# Patient Record
Sex: FEMALE | NOT HISPANIC OR LATINO | URBAN - METROPOLITAN AREA
[De-identification: names, ages, dates, MRNs, and addresses within clinical notes are randomized per-mention and may not be internally consistent; named-entity substitution may affect disease eponyms.]

---

## 2021-05-28 ENCOUNTER — TRANSFERRED RECORDS (OUTPATIENT)
Dept: FAMILY MEDICINE | Facility: CLINIC | Age: 31
End: 2021-05-28

## 2021-05-28 LAB — HEP C HIM: NORMAL

## 2023-12-19 NOTE — PROGRESS NOTES
SUBJECTIVE:   CC: Arcenio Zepeda is an 33 year old woman who presents for preventive health visit.     Therapy 2 times per week  Partner passed away 8 months ago  [place] about us through peds clinic   Feels increased fatigue, intermittent lightheadedness going from sitting to standing, does not eat meals consistently throughout the day due to stress with her children - tries to stay well hydrated but might drink as much as she should     Right shoulder pain that can start at base of lateral neck and come up into area around ear, worse in the morning, does not like her bed, feels to firm, feels neck pain has worsened since her partner passed away 8 months ago, also has medial scapular pain with movement of shoulder - intermittent     Patient has been advised of split billing requirements and indicates understanding: Yes  Healthy Habits:  Do you get at least three servings of calcium containing foods daily (dairy, green leafy vegetables, etc.)? yes  Amount of exercise or daily activities, outside of work: 2 day(s) per week  Problems taking medications regularly No  Medication side effects: No  Have you had an eye exam in the past two years? no  Do you see a dentist twice per year? yes  Do you have sleep apnea, excessive snoring or daytime drowsiness?no      Today's PHQ-2 Score:       12/21/2023     9:56 AM 5/16/2014     3:26 PM   PHQ-2 ( 1999 [employer])   Q1: Little interest or pleasure in doing things 3 1   Q2: Feeling down, depressed or hopeless 3 3   PHQ-2 Score 6 4       Abuse: Current or Past(Physical, Sexual or Emotional)- No  Do you feel safe in your environment? Yes    Have you ever done Advance Care Planning? (For example, a Health Directive, POLST, or a discussion with a medical provider or your loved ones about your wishes): Yes, patient states has an Advance Care Planning document and will bring a copy to the clinic.    Social History     Tobacco Use    Smoking status: Every Day     Packs/day: .1     Types:  "Cigarettes    Smokeless tobacco: Not on file   Substance Use Topics    Alcohol use: Not on file                        Reviewed orders with patient.  Reviewed health maintenance and updated orders accordingly - Yes  Lab work is in process    FHS-7:        No data to display                Patient under 40 years of age: Routine Mammogram Screening not recommended.   Pertinent mammograms are reviewed under the imaging tab.    Pertinent mammograms are reviewed under the imaging tab.  History of abnormal Pap smear: NO - age 30-65 PAP every 5 years with negative HPV co-testing recommended     Reviewed and updated as needed this visit by clinical staff   Tobacco  Allergies  Meds  Problems  Med Hx  Surg Hx  Fam Hx          Reviewed and updated as needed this visit by Provider   Tobacco  Allergies  Meds  Problems  Med Hx  Surg Hx  Fam Hx         History reviewed. No pertinent past medical history.     ROS:  CONSTITUTIONAL: NEGATIVE for fever, chills, change in weight  INTEGUMENTARU/SKIN: NEGATIVE for worrisome rashes, moles or lesions  EYES: NEGATIVE for vision changes or irritation  ENT: NEGATIVE for ear, mouth and throat problems  RESP: NEGATIVE for significant cough or SOB  BREAST: NEGATIVE for masses, tenderness or discharge  CV: NEGATIVE for chest pain, palpitations or peripheral edema  GI: NEGATIVE for nausea, abdominal pain, heartburn, or change in bowel habits  : NEGATIVE for unusual urinary or vaginal symptoms. Periods are regular.  MUSCULOSKELETAL: NEGATIVE for significant arthralgias or myalgia  NEURO: NEGATIVE for weakness, dizziness or paresthesias  PSYCHIATRIC: NEGATIVE for changes in mood or affect    OBJECTIVE:   /63   Pulse 65   Ht 1.689 m (5' 6.5\")   Wt 95.7 kg (211 lb)   LMP 12/04/2023   SpO2 100%   BMI 33.55 kg/m    EXAM:  GENERAL: healthy, alert and no distress  EYES: Eyes grossly normal to inspection, PERRL and conjunctivae and sclerae normal  HENT: ear canals and TM's " normal, nose and mouth without ulcers or lesions  NECK: no adenopathy, no asymmetry, masses, or scars and thyroid normal to palpation  RESP: lungs clear to auscultation - no rales, rhonchi or wheezes  BREAST: deferrred  CV: regular rate and rhythm, normal S1 S2, no S3 or S4, no murmur, click or rub, no peripheral edema and peripheral pulses strong  ABDOMEN: soft, nontender, no hepatosplenomegaly, no masses and bowel sounds normal  MS: no gross musculoskeletal defects noted, no edema  SKIN: dry, cracking skin to bilateral hands, no suspicious lesions or rashes  NEURO: Normal strength and tone, mentation intact and speech normal  PSYCH: mentation appears normal, affect normal/bright    Diagnostic Test Results:  Labs reviewed in Epic  Results for orders placed or performed in visit on 12/21/23 (from the past 24 hour(s))   CBC with Diff/Plt ([facility])   Result Value Ref Range    WBC x10/cmm 5.4 3.8 - 11.0 x10/cmm    % Lymphocytes 30.7 20.5 - 51.1 %    % Monocytes 6.5 1.7 - 9.3 %    % Granulocytes 62.8 42.2 - 75.2 %    RBC x10/cmm 3.88 3.7 - 5.2 x10/cmm    Hemoglobin 11.4 (A) 11.8 - 15.5 g/dl    Hematocrit 33.9 (A) 35 - 46 %    MCV 87.5 80 - 100 fL    MCH 29.4 27.0 - 31.0 pg    MCHC 33.6 33.0 - 37.0 g/dL    Platelet Count 348 140 - 450 K/uL       ASSESSMENT/PLAN:   Arcenio was seen today for physical and diabetes.    Diagnoses and all orders for this visit:    Routine general medical examination at a health care facility    Hyperlipidemia with target LDL less than 130  -     Lipid Profile ([facility])  -     VENOUS COLLECTION  - Diet and exercise discussed     Other fatigue  -     Comprehensive metabolic panel; Future  -     VENOUS COLLECTION  -     CBC with Diff/Plt ([facility])  -     Vitamin D Deficiency; Future  -     Ferritin; Future  -     Iron & Iron Binding Capacity; Future  -     TSH with free T4 reflex; Future  -     Comprehensive metabolic panel  -     Vitamin D Deficiency  -     Ferritin  -     Iron & Iron Binding Capacity  -    "  TSH with free T4 reflex  - Will draw lab work and based plan of care on results    Family history of diabetes mellitus  -     VENOUS COLLECTION  -     Cancel: Hemoglobin A1C ([facility])  -     Hemoglobin A1c; Future  -     Hemoglobin A1c  - Diet and exercise discussed     Eczema, unspecified type  -     triamcinolone (KENALOG) 0.1 % external cream; Apply topically 2 times daily    Acute pain of right shoulder  -     Physical Therapy Referral; Future        Patient has been advised of split billing requirements and indicates understanding: Yes  COUNSELING:   Reviewed preventive health counseling, as reflected in patient instructions       Regular exercise       Healthy diet/nutrition    Estimated body mass index is 33.55 kg/m  as calculated from the following:    Height as of this encounter: 1.689 m (5' 6.5\").    Weight as of this encounter: 95.7 kg (211 lb).    Weight management plan: Discussed healthy diet and exercise guidelines    She reports that she has been smoking cigarettes. She has been smoking an average of 0.1 packs per day. She does not have any smokeless tobacco history on file.  Nicotine/Tobacco Cessation Plan:   Information offered: Patient not interested at this time      Counseling Resources:  ATP IV Guidelines  Pooled Cohorts Equation Calculator  Breast Cancer Risk Calculator  BRCA-Related Cancer Risk Assessment: FHS-7 Tool  FRAX Risk Assessment  ICSI Preventive Guidelines  Dietary Guidelines for Americans, 2010  USDA's MyPlate  ASA Prophylaxis  Lung CA Screening    NANDA Singer [facility]  "

## 2023-12-21 ENCOUNTER — OFFICE VISIT (OUTPATIENT)
Dept: FAMILY MEDICINE | Facility: CLINIC | Age: 33
End: 2023-12-21

## 2023-12-21 VITALS
WEIGHT: 211 LBS | SYSTOLIC BLOOD PRESSURE: 127 MMHG | OXYGEN SATURATION: 100 % | HEART RATE: 65 BPM | DIASTOLIC BLOOD PRESSURE: 63 MMHG | BODY MASS INDEX: 33.12 KG/M2 | HEIGHT: 67 IN

## 2023-12-21 DIAGNOSIS — E78.5 HYPERLIPIDEMIA WITH TARGET LDL LESS THAN 130: ICD-10-CM

## 2023-12-21 DIAGNOSIS — L30.9 ECZEMA, UNSPECIFIED TYPE: ICD-10-CM

## 2023-12-21 DIAGNOSIS — Z83.3 FAMILY HISTORY OF DIABETES MELLITUS: ICD-10-CM

## 2023-12-21 DIAGNOSIS — R53.83 OTHER FATIGUE: ICD-10-CM

## 2023-12-21 DIAGNOSIS — Z00.00 ROUTINE GENERAL MEDICAL EXAMINATION AT A HEALTH CARE FACILITY: Primary | ICD-10-CM

## 2023-12-21 DIAGNOSIS — M25.511 ACUTE PAIN OF RIGHT SHOULDER: ICD-10-CM

## 2023-12-21 LAB
% GRANULOCYTES: 62.8 % (ref 42.2–75.2)
ALBUMIN SERPL BCG-MCNC: 4.3 G/DL (ref 3.5–5.2)
ALP SERPL-CCNC: 49 U/L (ref 40–150)
ALT SERPL W P-5'-P-CCNC: 8 U/L (ref 0–50)
ANION GAP SERPL CALCULATED.3IONS-SCNC: 8 MMOL/L (ref 7–15)
AST SERPL W P-5'-P-CCNC: 17 U/L (ref 0–45)
BILIRUB SERPL-MCNC: 0.2 MG/DL
BUN SERPL-MCNC: 9.6 MG/DL (ref 6–20)
CALCIUM SERPL-MCNC: 8.9 MG/DL (ref 8.6–10)
CHLORIDE SERPL-SCNC: 103 MMOL/L (ref 98–107)
CHOLESTEROL: 164 MG/DL (ref 100–199)
CREAT SERPL-MCNC: 0.7 MG/DL (ref 0.51–0.95)
DEPRECATED HCO3 PLAS-SCNC: 26 MMOL/L (ref 22–29)
EGFRCR SERPLBLD CKD-EPI 2021: >90 ML/MIN/1.73M2
FASTING?: YES
FERRITIN SERPL-MCNC: 15 NG/ML (ref 6–175)
GLUCOSE SERPL-MCNC: 83 MG/DL (ref 70–99)
HBA1C MFR BLD: 5.3 %
HCT VFR BLD AUTO: 33.9 % (ref 35–46)
HDL (RMG): 70 MG/DL (ref 40–?)
HEMOGLOBIN: 11.4 G/DL (ref 11.8–15.5)
IRON BINDING CAPACITY (ROCHE): 388 UG/DL (ref 240–430)
IRON SATN MFR SERPL: 20 % (ref 15–46)
IRON SERPL-MCNC: 78 UG/DL (ref 37–145)
LDL CALCULATED (RMG): 75 MG/DL (ref 0–130)
LYMPHOCYTES NFR BLD AUTO: 30.7 % (ref 20.5–51.1)
MCH RBC QN AUTO: 29.4 PG (ref 27–31)
MCHC RBC AUTO-ENTMCNC: 33.6 G/DL (ref 33–37)
MCV RBC AUTO: 87.5 FL (ref 80–100)
MONOCYTES NFR BLD AUTO: 6.5 % (ref 1.7–9.3)
PLATELET # BLD AUTO: 348 K/UL (ref 140–450)
POTASSIUM SERPL-SCNC: 4.2 MMOL/L (ref 3.4–5.3)
PROT SERPL-MCNC: 7 G/DL (ref 6.4–8.3)
RBC # BLD AUTO: 3.88 X10/CMM (ref 3.7–5.2)
SODIUM SERPL-SCNC: 137 MMOL/L (ref 135–145)
TRIGLYCERIDES (RMG): 97 MG/DL (ref 0–149)
TSH SERPL DL<=0.005 MIU/L-ACNC: 0.92 UIU/ML (ref 0.3–4.2)
VIT D+METAB SERPL-MCNC: 24 NG/ML (ref 20–50)
WBC # BLD AUTO: 5.4 X10/CMM (ref 3.8–11)

## 2023-12-21 PROCEDURE — 84443 ASSAY THYROID STIM HORMONE: CPT

## 2023-12-21 PROCEDURE — 85025 COMPLETE CBC W/AUTO DIFF WBC: CPT

## 2023-12-21 PROCEDURE — 82728 ASSAY OF FERRITIN: CPT

## 2023-12-21 PROCEDURE — 82306 VITAMIN D 25 HYDROXY: CPT

## 2023-12-21 PROCEDURE — 80053 COMPREHEN METABOLIC PANEL: CPT

## 2023-12-21 PROCEDURE — 99385 PREV VISIT NEW AGE 18-39: CPT

## 2023-12-21 PROCEDURE — 83550 IRON BINDING TEST: CPT

## 2023-12-21 PROCEDURE — 83036 HEMOGLOBIN GLYCOSYLATED A1C: CPT

## 2023-12-21 PROCEDURE — 36415 COLL VENOUS BLD VENIPUNCTURE: CPT

## 2023-12-21 PROCEDURE — 80061 LIPID PANEL: CPT | Mod: QW

## 2023-12-21 PROCEDURE — 83540 ASSAY OF IRON: CPT

## 2023-12-21 RX ORDER — FERROUS GLUCONATE 324(38)MG
324 TABLET ORAL
COMMUNITY

## 2023-12-21 RX ORDER — TRIAMCINOLONE ACETONIDE 1 MG/G
CREAM TOPICAL 2 TIMES DAILY
Qty: 45 G | Refills: 0 | Status: SHIPPED | OUTPATIENT
Start: 2023-12-21 | End: 2024-07-01

## 2023-12-21 RX ORDER — MULTIPLE VITAMINS W/ MINERALS TAB 9MG-400MCG
1 TAB ORAL DAILY
COMMUNITY

## 2023-12-21 RX ORDER — MULTIVITAMIN WITH IRON
1 TABLET ORAL DAILY
COMMUNITY

## 2023-12-21 ASSESSMENT — ANXIETY QUESTIONNAIRES
3. WORRYING TOO MUCH ABOUT DIFFERENT THINGS: MORE THAN HALF THE DAYS
GAD7 TOTAL SCORE: 12
IF YOU CHECKED OFF ANY PROBLEMS ON THIS QUESTIONNAIRE, HOW DIFFICULT HAVE THESE PROBLEMS MADE IT FOR YOU TO DO YOUR WORK, TAKE CARE OF THINGS AT HOME, OR GET ALONG WITH OTHER PEOPLE: SOMEWHAT DIFFICULT
6. BECOMING EASILY ANNOYED OR IRRITABLE: NEARLY EVERY DAY
1. FEELING NERVOUS, ANXIOUS, OR ON EDGE: NEARLY EVERY DAY
GAD7 TOTAL SCORE: 12
7. FEELING AFRAID AS IF SOMETHING AWFUL MIGHT HAPPEN: NOT AT ALL
2. NOT BEING ABLE TO STOP OR CONTROL WORRYING: MORE THAN HALF THE DAYS
5. BEING SO RESTLESS THAT IT IS HARD TO SIT STILL: NOT AT ALL

## 2023-12-21 ASSESSMENT — PATIENT HEALTH QUESTIONNAIRE - PHQ9
5. POOR APPETITE OR OVEREATING: MORE THAN HALF THE DAYS
SUM OF ALL RESPONSES TO PHQ QUESTIONS 1-9: 17

## 2024-02-06 ENCOUNTER — OFFICE VISIT (OUTPATIENT)
Dept: FAMILY MEDICINE | Facility: CLINIC | Age: 34
End: 2024-02-06

## 2024-02-06 VITALS
BODY MASS INDEX: 34.18 KG/M2 | OXYGEN SATURATION: 100 % | SYSTOLIC BLOOD PRESSURE: 127 MMHG | WEIGHT: 215 LBS | TEMPERATURE: 99.8 F | DIASTOLIC BLOOD PRESSURE: 67 MMHG | HEART RATE: 90 BPM

## 2024-02-06 DIAGNOSIS — J02.9 ACUTE PHARYNGITIS, UNSPECIFIED ETIOLOGY: ICD-10-CM

## 2024-02-06 DIAGNOSIS — J02.0 ACUTE STREPTOCOCCAL PHARYNGITIS: Primary | ICD-10-CM

## 2024-02-06 LAB
INFLUENZA A (RMG): NEGATIVE
INFLUENZA B (RMG): NEGATIVE
S PYO AG THROAT QL IA.RAPID: POSITIVE
SARS ANTIGEN (RMG): NEGATIVE

## 2024-02-06 PROCEDURE — 99213 OFFICE O/P EST LOW 20 MIN: CPT

## 2024-02-06 PROCEDURE — 87430 STREP A AG IA: CPT

## 2024-02-06 PROCEDURE — 87428 SARSCOV & INF VIR A&B AG IA: CPT | Mod: QW

## 2024-02-06 RX ORDER — PENICILLIN V POTASSIUM 500 MG/1
500 TABLET, FILM COATED ORAL 2 TIMES DAILY
Qty: 20 TABLET | Refills: 0 | Status: SHIPPED | OUTPATIENT
Start: 2024-02-06 | End: 2024-02-16

## 2024-02-06 NOTE — PROGRESS NOTES
Assessment & Plan     Acute pharyngitis, unspecified etiology  - Influenza + SARS Antigen ([facility])  - Rapid Strep ([facility])    Acute streptococcal pharyngitis  Rapid strep test was positive. Rx for Penicillin. Side effects reviewed. Take antibiotic as prescribed for the full duration. Limit contacts -you are contagious until you have been on antibiotics for 24 hours. Change out tooth brush in 24 hours. May take OTC analgesics as needed for pain. Discussed plenty of fluids, rest, and throat lozenges. Follow up as needed for new or worsening symptoms. Patient agreeable to plan. All questions answered.    - penicillin V (VEETID) 500 MG tablet  Dispense: 20 tablet; Refill: 0            Work on weight loss  Regular exercise  See Patient Instructions    Return if symptoms worsen or fail to improve, for Follow up.    Jennifer Rg is a 34 year old, presenting for the following health issues:  URI (Body aches, chills, fatigue, headache, throat pain, bloody nose/Covid test this am: Negative/Burned throat badly on coffee yesterday and got adjustment at chiropractor/Symptoms started yesterday)    HPI       Concern - URI, sore throat  Onset: Last night while sleeping  Description: Stabbing, dull ache in throat  Intensity: 7/10  Progression of Symptoms:  same and constant  Accompanying Signs & Symptoms: Sore throat, body aches, chills, fatigue  Previous history of similar problem: felt like strep  Precipitating factors:        Worsened by: Swallowing  Alleviating factors:        Improved by: sleep   Therapies tried and outcome: cough drops  Kids in . Daughter was recently sick      Review of Systems  Constitutional, HEENT, cardiovascular, pulmonary, gi and gu systems are negative, except as otherwise noted.      Objective    /67   Pulse 90   Temp 99.8  F (37.7  C)   Wt 97.5 kg (215 lb)   LMP 12/04/2023   SpO2 100%   BMI 34.18 kg/m    Body mass index is 34.18 kg/m .  Physical Exam   GENERAL: alert and no  distress  HENT: normal cephalic/atraumatic, ear canals and TM's normal, nose and mouth without ulcers or lesions, oropharynx clear, oral mucous membranes moist, and pharynx erythema  NECK: cervical adenopathy +1 bilaterally   RESP: lungs clear to auscultation - no rales, rhonchi or wheezes  CV: regular rate and rhythm, normal S1 S2, no S3 or S4, no murmur, click or rub, no peripheral edema  PSYCH: mentation appears normal, affect normal/bright    Results for orders placed or performed in visit on 02/06/24 (from the past 24 hour(s))   Influenza + SARS Antigen ([facility])   Result Value Ref Range    INFLUENZA A ([facility]) Negative Negative    INFLUENZA B ([facility]) Negative Negative    SARS ANTIGEN ([facility]) Negative Negative   Rapid Strep ([facility])   Result Value Ref Range    Rapid Strep A Screen Positive (A)            Signed Electronically by: NANDA Campos CNP

## 2024-02-20 ENCOUNTER — OFFICE VISIT (OUTPATIENT)
Dept: FAMILY MEDICINE | Facility: CLINIC | Age: 34
End: 2024-02-20

## 2024-02-20 VITALS
WEIGHT: 215 LBS | HEART RATE: 78 BPM | DIASTOLIC BLOOD PRESSURE: 69 MMHG | SYSTOLIC BLOOD PRESSURE: 121 MMHG | OXYGEN SATURATION: 99 % | BODY MASS INDEX: 34.18 KG/M2

## 2024-02-20 DIAGNOSIS — M79.674 PAIN IN TOES OF BOTH FEET: Primary | ICD-10-CM

## 2024-02-20 DIAGNOSIS — M79.675 PAIN IN TOES OF BOTH FEET: Primary | ICD-10-CM

## 2024-02-20 PROCEDURE — 99213 OFFICE O/P EST LOW 20 MIN: CPT

## 2024-02-20 NOTE — PROGRESS NOTES
Assessment & Plan     Pain in toes of both feet  Unknown etiology. Reviewed potential contributing causes. Recommend OTC analgesics as needed for pain or discomfort. Reviewed resting and elevation as needed. Red flags that warrant emergent evaluation discussed. Follow up as needed for new or worsening symptoms or if symptoms fail to improve. Patient agreeable to plan. All questions answered.         Work on weight loss  Regular exercise  See Patient Instructions    Return if symptoms worsen or fail to improve, for Follow up.    Jennifer Rg is a 34 year old, presenting for the following health issues:  Throat Problem (Strep + 12 days ago/Lots of phlegm (better today)) and Toe Pain (Tips of toes - comes/goes. Can be painful couple days, red, inflamed. Dull throb, itchy, painful to touch/Trying different shoes (bigger), socks off)    HPI     Post strep: Feeling better but states she feels like there was a constant ball of phlegm in the back of her throat. Worse after taking the antibiotics. Symptoms went away yesterday.       Concern - Tips of toes  Onset: 5 months. No injury  Description: Comes randomly, no connections to anything. Half of her toes will hurt and painful to the point she doesn't want to walk. Feels that it has been getting worse then goes away after 4 days. Has light symptoms today. Better today.   Intensity: none today  Progression of Symptoms:  improving, intermittent  Accompanying Signs & Symptoms: Feels swollen but doesn't look swollen. Painful to the touch  Previous history of similar problem: just toes localized, no prior history of symptoms  Precipitating factors:        Worsened by: walking  Alleviating factors:        Improved by: none, just starts going away on its own  Therapies tried and outcome: different socks and shoes. Never painful enough to try OTC medications        Review of Systems  Constitutional, HEENT, cardiovascular, pulmonary, gi and gu systems are negative, except  as otherwise noted.      Objective    /69   Pulse 78   Wt 97.5 kg (215 lb)   SpO2 99%   BMI 34.18 kg/m    Body mass index is 34.18 kg/m .  Physical Exam   GENERAL: alert and no distress  RESP: lungs clear to auscultation - no rales, rhonchi or wheezes  CV: regular rate and rhythm, normal S1 S2, no S3 or S4, no murmur, click or rub, no peripheral edema  MS: no gross musculoskeletal defects noted, no edema  SKIN: no suspicious lesions or rashes  NEURO: Normal strength and tone, mentation intact and speech normal  PSYCH: mentation appears normal, affect normal/bright          Signed Electronically by: NANDA Campos CNP

## 2024-07-01 ENCOUNTER — VIRTUAL VISIT (OUTPATIENT)
Dept: FAMILY MEDICINE | Facility: CLINIC | Age: 34
End: 2024-07-01

## 2024-07-01 DIAGNOSIS — K21.9 GASTROESOPHAGEAL REFLUX DISEASE WITHOUT ESOPHAGITIS: Primary | ICD-10-CM

## 2024-07-01 DIAGNOSIS — L30.9 ECZEMA, UNSPECIFIED TYPE: ICD-10-CM

## 2024-07-01 PROCEDURE — 99442 PR PHYSICIAN TELEPHONE EVALUATION 11-20 MIN: CPT | Mod: 95

## 2024-07-01 RX ORDER — TRIAMCINOLONE ACETONIDE 1 MG/G
CREAM TOPICAL 2 TIMES DAILY
Qty: 45 G | Refills: 0 | Status: SHIPPED | OUTPATIENT
Start: 2024-07-01

## 2024-07-01 RX ORDER — OMEPRAZOLE 40 MG/1
40 CAPSULE, DELAYED RELEASE ORAL DAILY
Qty: 60 CAPSULE | Refills: 1 | Status: SHIPPED | OUTPATIENT
Start: 2024-07-01

## 2024-07-01 RX ORDER — OMEPRAZOLE 40 MG/1
40 CAPSULE, DELAYED RELEASE ORAL DAILY
Qty: 30 CAPSULE | Refills: 0 | Status: SHIPPED | OUTPATIENT
Start: 2024-07-01 | End: 2024-07-01 | Stop reason: DRUGHIGH

## 2024-07-01 NOTE — PATIENT INSTRUCTIONS
"Gastroesophageal reflux (GERD)/Heartburn:     *  Your symptoms are due to acid from the stomach going up into the esophagus and causing irritation.     *  Anti reflux measures:     --Avoid meals within 3 hours of bedtime.     --Consider using a \"wedge pillow\" to elevated your head while sleeping.      Wedge pillow:  Can get these at any medical supply store or [employer]        *  Many times, this condition is caused by what you eat, when you eat, how you eat, how much you eat.    If there are foods, food combinations or eating patterns that cause your symptoms to be worse, then alter these things to see if things improve.     * Specifically avoid any specific foods that cause problems, such as tomato sauces/onions/peppermints/spearmint.    Avoid OTC medications such as Aspirin , Ibuprofen or Aleve which may cause stomach irritation.     *  Trial of Omeprazole (prilosec) either prescription or over the counter version, 20 mg once per day for the next 2 weeks.    *  Try Simethicone (Gas-x, Phazyme, Riopan Plus, Maalox Plus, etc.) as needed for gas and bloating.    Avoidance or reduction of foods that may aggravate reflux, including spicy or fatty (including fried) foods, acidic foods (eg, tomato and citrus juices), chocolate, mints, coffee, tea, jose f, and alcohol  ?Avoidance of eating or drinking two to three hours before bedtime and lying down  ?Elevation of the head of the bed  ?Stopping smoking  ?Eating smaller meals  ?Avoidance of clothing with a constricting waistband  ?Maintenance of ideal weight  ?Avoidance of bending over after eating    If these are not helpful and you continue to have symptoms, then we may want to try prescription medications such as Nexium or Prevacid if these types of medications are covered by your insurance.    Some insurance plans will not cover such prescription medications for stomach acid now that Prilosec is available over the counter.  Some insurance plans may cover " these medications if you try OTC Prilosec and it does not work.    *  If you are still having troubles despite this medication, then return to see me, we may need to consider further testing.    *  If the medication works well, then continue it.  Call me to let me know and I can send further presriptions to the pharmacy.      Chewing gum can help with heartburn (sugarless, not peppermint gum as this can cause heartburn)     Avoid chocolate, peppermint, caffeine, and milk    No eating 2-3 hours prior to bed    Eating smaller, more frequent meals     Avoid high fat foods          Taper:   Omeprazole 40mg daily for 1 month  Omeprazole 20mg daily for 1 week  Omeprazole 20mg every other day for 1 week  Omeprazole 20mg two times per week for 1 week then STOP

## 2024-07-01 NOTE — PROGRESS NOTES
"Arcenio is a 34 year old who is being evaluated via a billable telephone visit.    What phone number would you like to be contacted at? [phone]  How would you like to obtain your AVS? MyChart  Originating Location (pt. Location): Home    Distant Location (provider location):  On-site    Assessment & Plan     Eczema, unspecified type  - triamcinolone (KENALOG) 0.1 % external cream  Dispense: 45 g; Refill: 0    Gastroesophageal reflux disease without esophagitis  -6-12 weeks of omeprazole with recheck if not improving   - discussed taper and put on print out for patient   - omeprazole (PRILOSEC) 40 MG DR capsule  Dispense: 30 capsule; Refill: 0  - omeprazole (PRILOSEC) 20 MG DR capsule  Dispense: 30 capsule; Refill: 0            BMI  Estimated body mass index is 34.18 kg/m  as calculated from the following:    Height as of 12/21/23: 1.689 m (5' 6.5\").    Weight as of 2/20/24: 97.5 kg (215 lb).             No follow-ups on file.    Subjective   Arcenio is a 34 year old, presenting for the following health issues:  Heartburn (Ongoing heartburn issues. Was on daily prilosec before pregnancy. Has needed Tums after every meal lately, wondering if she might need something stronger.)    HPI     1.) GERD: smokes 1 cigarette per day - this has been decreased, decreasing alcohol  Had endoscopy at age 22 years old      Review of Systems  Constitutional, HEENT, cardiovascular, pulmonary, gi and gu systems are negative, except as otherwise noted.      Objective           Vitals:  No vitals were obtained today due to virtual visit.    Physical Exam   General: Alert and no distress //Respiratory: No audible wheeze, cough, or shortness of breath // Psychiatric:  Appropriate affect, tone, and pace of words      No results found for this or any previous visit (from the past 24 hour(s)).      Phone call duration: 15 - minutes  Signed Electronically by: NANDA Singer CNP    "

## 2024-07-29 ENCOUNTER — OFFICE VISIT (OUTPATIENT)
Dept: FAMILY MEDICINE | Facility: CLINIC | Age: 34
End: 2024-07-29

## 2024-07-29 VITALS
OXYGEN SATURATION: 98 % | BODY MASS INDEX: 34.53 KG/M2 | HEART RATE: 69 BPM | SYSTOLIC BLOOD PRESSURE: 112 MMHG | DIASTOLIC BLOOD PRESSURE: 67 MMHG | WEIGHT: 217.2 LBS

## 2024-07-29 DIAGNOSIS — B36.9 FUNGAL SKIN INFECTION: ICD-10-CM

## 2024-07-29 DIAGNOSIS — R26.89 POOR BALANCE: Primary | ICD-10-CM

## 2024-07-29 DIAGNOSIS — F10.10 MILD ALCOHOL USE DISORDER: ICD-10-CM

## 2024-07-29 DIAGNOSIS — R43.2 DYSGEUSIA: ICD-10-CM

## 2024-07-29 DIAGNOSIS — D64.9 LOW HEMOGLOBIN: ICD-10-CM

## 2024-07-29 DIAGNOSIS — K21.9 GASTROESOPHAGEAL REFLUX DISEASE WITHOUT ESOPHAGITIS: ICD-10-CM

## 2024-07-29 DIAGNOSIS — R53.83 OTHER FATIGUE: ICD-10-CM

## 2024-07-29 DIAGNOSIS — Q07.00 ARNOLD-CHIARI MALFORMATION (H): ICD-10-CM

## 2024-07-29 DIAGNOSIS — N92.0 MENORRHAGIA WITH REGULAR CYCLE: ICD-10-CM

## 2024-07-29 LAB
% GRANULOCYTES: 57.1 % (ref 42.2–75.2)
ALBUMIN SERPL BCG-MCNC: 4.3 G/DL (ref 3.5–5.2)
ALP SERPL-CCNC: 53 U/L (ref 40–150)
ALT SERPL W P-5'-P-CCNC: 12 U/L (ref 0–50)
ANION GAP SERPL CALCULATED.3IONS-SCNC: 13 MMOL/L (ref 7–15)
AST SERPL W P-5'-P-CCNC: 17 U/L (ref 0–45)
BILIRUB SERPL-MCNC: 0.2 MG/DL
BUN SERPL-MCNC: 13.8 MG/DL (ref 6–20)
CALCIUM SERPL-MCNC: 9.3 MG/DL (ref 8.8–10.4)
CHLORIDE SERPL-SCNC: 101 MMOL/L (ref 98–107)
CREAT SERPL-MCNC: 0.73 MG/DL (ref 0.51–0.95)
EGFRCR SERPLBLD CKD-EPI 2021: >90 ML/MIN/1.73M2
FASTING STATUS PATIENT QL REPORTED: NO
GLUCOSE SERPL-MCNC: 84 MG/DL (ref 70–99)
HCO3 SERPL-SCNC: 20 MMOL/L (ref 22–29)
HCT VFR BLD AUTO: 32.7 % (ref 35–46)
HEMOGLOBIN: 10.8 G/DL (ref 11.8–15.5)
IRON BINDING CAPACITY (ROCHE): 419 UG/DL (ref 240–430)
IRON SATN MFR SERPL: 10 % (ref 15–46)
IRON SERPL-MCNC: 42 UG/DL (ref 37–145)
LYMPHOCYTES NFR BLD AUTO: 35.6 % (ref 20.5–51.1)
MCH RBC QN AUTO: 28.1 PG (ref 27–31)
MCHC RBC AUTO-ENTMCNC: 33 G/DL (ref 33–37)
MCV RBC AUTO: 85.1 FL (ref 80–100)
MONOCYTES NFR BLD AUTO: 7.3 % (ref 1.7–9.3)
PLATELET # BLD AUTO: 334 K/UL (ref 140–450)
POTASSIUM SERPL-SCNC: 3.8 MMOL/L (ref 3.4–5.3)
PROT SERPL-MCNC: 7.3 G/DL (ref 6.4–8.3)
RBC # BLD AUTO: 3.84 X10/CMM (ref 3.7–5.2)
SODIUM SERPL-SCNC: 134 MMOL/L (ref 135–145)
TSH SERPL DL<=0.005 MIU/L-ACNC: 1.43 UIU/ML (ref 0.3–4.2)
WBC # BLD AUTO: 5.2 X10/CMM (ref 3.8–11)

## 2024-07-29 PROCEDURE — 82607 VITAMIN B-12: CPT

## 2024-07-29 PROCEDURE — 82728 ASSAY OF FERRITIN: CPT

## 2024-07-29 PROCEDURE — 83540 ASSAY OF IRON: CPT | Mod: 90

## 2024-07-29 PROCEDURE — 82728 ASSAY OF FERRITIN: CPT | Mod: 90

## 2024-07-29 PROCEDURE — 82607 VITAMIN B-12: CPT | Mod: 90

## 2024-07-29 PROCEDURE — 80053 COMPREHEN METABOLIC PANEL: CPT | Mod: 90

## 2024-07-29 PROCEDURE — 83550 IRON BINDING TEST: CPT

## 2024-07-29 PROCEDURE — 83550 IRON BINDING TEST: CPT | Mod: 90

## 2024-07-29 PROCEDURE — G2211 COMPLEX E/M VISIT ADD ON: HCPCS

## 2024-07-29 PROCEDURE — 84443 ASSAY THYROID STIM HORMONE: CPT

## 2024-07-29 PROCEDURE — 80053 COMPREHEN METABOLIC PANEL: CPT

## 2024-07-29 PROCEDURE — 84443 ASSAY THYROID STIM HORMONE: CPT | Mod: 90

## 2024-07-29 PROCEDURE — 36415 COLL VENOUS BLD VENIPUNCTURE: CPT

## 2024-07-29 PROCEDURE — 85025 COMPLETE CBC W/AUTO DIFF WBC: CPT

## 2024-07-29 PROCEDURE — 99214 OFFICE O/P EST MOD 30 MIN: CPT

## 2024-07-29 RX ORDER — CLOTRIMAZOLE 1 %
CREAM (GRAM) TOPICAL 2 TIMES DAILY
Qty: 45 G | Refills: 0 | Status: SHIPPED | OUTPATIENT
Start: 2024-07-29 | End: 2024-08-12

## 2024-07-29 RX ORDER — OMEPRAZOLE 40 MG/1
40 CAPSULE, DELAYED RELEASE ORAL DAILY
Qty: 30 CAPSULE | Refills: 0 | Status: SHIPPED | OUTPATIENT
Start: 2024-07-29 | End: 2024-08-29

## 2024-07-29 NOTE — LETTER
August 9, 2024          Arcenio Zepeda  [address] [facility] [address]        Hi Arcenio-     Your lab work is very reassuring.  MRI is stable.  I am unsure of what the cause is of your symptoms.  We can give this time to see if it improves and if not improving or worsening over the next couple weeks please return for further work up.       Thank you for trusting me with your care,   Sarah Davies, APRN CNP     Resulted Orders   CBC with Diff/Plt ([facility])   Result Value Ref Range    WBC x10/cmm 5.2 3.8 - 11.0 x10/cmm    % Lymphocytes 35.6 20.5 - 51.1 %    % Monocytes 7.3 1.7 - 9.3 %    % Granulocytes 57.1 42.2 - 75.2 %    RBC x10/cmm 3.84 3.7 - 5.2 x10/cmm    Hemoglobin 10.8 (A) 11.8 - 15.5 g/dl    Hematocrit 32.7 (A) 35 - 46 %    MCV 85.1 80 - 100 fL    MCH 28.1 27.0 - 31.0 pg    MCHC 33.0 33.0 - 37.0 g/dL    Platelet Count 334 140 - 450 K/uL   Comprehensive metabolic panel   Result Value Ref Range    Sodium 134 (L) 135 - 145 mmol/L    Potassium 3.8 3.4 - 5.3 mmol/L    Carbon Dioxide (CO2) 20 (L) 22 - 29 mmol/L    Anion Gap 13 7 - 15 mmol/L    Urea Nitrogen 13.8 6.0 - 20.0 mg/dL    Creatinine 0.73 0.51 - 0.95 mg/dL    GFR Estimate >90 >60 mL/min/1.73m2      Comment:      eGFR calculated using 2021 CKD-EPI equation.    Calcium 9.3 8.8 - 10.4 mg/dL      Comment:      Reference intervals for this test were updated on 7/16/2024 to reflect our healthy population more accurately. There may be differences in the flagging of prior results with similar values performed with this method. Those prior results can be interpreted in the context of the updated reference intervals.    Chloride 101 98 - 107 mmol/L    Glucose 84 70 - 99 mg/dL    Alkaline Phosphatase 53 40 - 150 U/L    AST 17 0 - 45 U/L    ALT 12 0 - 50 U/L    Protein Total 7.3 6.4 - 8.3 g/dL    Albumin 4.3 3.5 - 5.2 g/dL    Bilirubin Total 0.2 <=1.2 mg/dL    Patient Fasting > 8hrs? No    TSH   Result Value Ref Range    TSH 1.43 0.30 - 4.20 uIU/mL   Ferritin    Result Value Ref Range    Ferritin 11 6 - 175 ng/mL   Iron & Iron Binding Capacity   Result Value Ref Range    Iron 42 37 - 145 ug/dL    Iron Binding Capacity 419 240 - 430 ug/dL    Iron Sat Index 10 (L) 15 - 46 %   Vitamin B12   Result Value Ref Range    Vitamin B12 370 232 - 1,245 pg/mL       If you have any questions or concerns, please call the clinic at the number listed above.       Sincerely,      NANDA Singer CNP

## 2024-07-29 NOTE — PROGRESS NOTES
"  Assessment & Plan     Dysgeusia  - suspect r/t tooth abscess on right lower molar   - VENOUS COLLECTION  - TSH    Arnold-Chiari malformation (H)  - has not had updated MRI in years - ordered and she should complete this ASAP given symptoms  - MR Brain w/o & w Contrast  - VENOUS COLLECTION    Poor balance  - worsening over the past month - feels she turns and loses her balance - never falls, physical exam shows nystagumus bilaterally today - suspect BPPV - exercises provided   - Red flags that warrant emergent evaluation discussed  - Patient verbalized understanding and is agreeable to the discussed plan of care.    - CBC with Diff/Plt ([facility])  - Comprehensive metabolic panel  - VENOUS COLLECTION  - Comprehensive metabolic panel  - TSH    Other fatigue  - CBC with Diff/Plt ([facility])  - Comprehensive metabolic panel  - VENOUS COLLECTION  - Comprehensive metabolic panel  - TSH    Mild alcohol use disorder  - rare alcohol use, drinking r/t trauma of losing her partner last year, is improving on alcohol use, has not drank in months - will check lab work today   - CBC with Diff/Plt ([facility])  - Comprehensive metabolic panel  - VENOUS COLLECTION  - Comprehensive metabolic panel    Gastroesophageal reflux disease without esophagitis  - 40 mg then has 20mg to taper down to after a few months if she wants - does not want to be on this long term   - omeprazole (PRILOSEC) 40 MG DR capsule  Dispense: 30 capsule; Refill: 0  - VENOUS COLLECTION    Fungal skin infection  - suspect fungal skin infection of perineum - clotrimazole use for 1 week and if not improving will need further work up   - clotrimazole (LOTRIMIN) 1 % external cream  Dispense: 45 g; Refill: 0  - VENOUS COLLECTION            BMI  Estimated body mass index is 34.53 kg/m  as calculated from the following:    Height as of 12/21/23: 1.689 m (5' 6.5\").    Weight as of this encounter: 98.5 kg (217 lb 3.2 oz).             Return in about 5 days (around 8/3/2024), or if symptoms " worsen or fail to improve, for Follow up.    Jennifer Rg is a 34 year old, presenting for the following health issues:  Other (Metallic taste in her mouth, fatigued, loss of balance (feels like she has been stumbling a lot), she was just on her period and reports it also smelt very metallic, went to the dentist today and was told she has an abscess in one of her molars which needs to be pulled which could attribute to the taste /Symptoms have been ongoing for about 2 weeks /Pt also reports she has Chiari Malformation which she is supposed to be getting a type of head scan (CT?) every year and has not had one since  )    HPI     1.) fatigue, lightheadedness, metallic taste in mouth  -new medications? - none   -sinus congestion? URI? COVID? - none   -dental concerns - dental hygiene? - tooth abscess   -dietary changes? Vitamins/supplements? - not taking any medications       Patient sees chiropractor for issues with balance and this usually helps significantly but now over the past couple months worse balance issues.  Reports when she turns her head or shifts directions she states she looses her balance - has never fallen.  She usually has improvement with chiropractor but now it is persistent without improvement from chiropractor.  Never feels nauseous with this, no dizziness or lightheadedness.   Loose, urgency with bowel movements over past month, one time per day, dark brown, denies black tarry or hematochezia.  Patient has stopped drinking alcohol for 3 weeks.  Drank for about 1 week until she blacked out one day and has stopped now.  Sober for 1 week. Has been like this since her partner  where every few months will drink 1 day until she blacks out and then stops.    Menorrhagia with recent period - soaking through pad after 1 hour, period ended a few days ago     Wt Readings from Last 4 Encounters:   24 98.5 kg (217 lb 3.2 oz)   24 97.5 kg (215 lb)   24 97.5 kg (215 lb)    12/21/23 95.7 kg (211 lb)         Review of Systems  Constitutional, neuro, ENT, endocrine, pulmonary, cardiac, gastrointestinal, genitourinary, musculoskeletal, integument and psychiatric systems are negative, except as otherwise noted.      Objective    /67   Pulse 69   Wt 98.5 kg (217 lb 3.2 oz)   SpO2 98%   BMI 34.53 kg/m    Body mass index is 34.53 kg/m .  Physical Exam   GENERAL: alert and no distress  EYES: Eyes grossly normal to inspection, PERRL, EOMI, and nystagmus bilateral   HENT: ear canals and TM's normal, nose and mouth without ulcers or lesions  NECK: no adenopathy, no asymmetry, masses, or scars  RESP: lungs clear to auscultation - no rales, rhonchi or wheezes  CV: regular rate and rhythm, normal S1 S2, no S3 or S4, no murmur, click or rub, no peripheral edema  ABDOMEN: soft, nontender, no hepatosplenomegaly, no masses and bowel sounds normal  MS: no gross musculoskeletal defects noted, no edema  SKIN: mild erythema with gail edging rash around anus into perineum   NEURO: Normal strength and tone, mentation intact and speech normal  PSYCH: mentation appears normal, affect normal/bright    Results for orders placed or performed in visit on 07/29/24 (from the past 24 hour(s))   CBC with Diff/Plt ([facility])   Result Value Ref Range    WBC x10/cmm 5.2 3.8 - 11.0 x10/cmm    % Lymphocytes 35.6 20.5 - 51.1 %    % Monocytes 7.3 1.7 - 9.3 %    % Granulocytes 57.1 42.2 - 75.2 %    RBC x10/cmm 3.84 3.7 - 5.2 x10/cmm    Hemoglobin 10.8 (A) 11.8 - 15.5 g/dl    Hematocrit 32.7 (A) 35 - 46 %    MCV 85.1 80 - 100 fL    MCH 28.1 27.0 - 31.0 pg    MCHC 33.0 33.0 - 37.0 g/dL    Platelet Count 334 140 - 450 K/uL           Signed Electronically by: NANDA Singer CNP      The longitudinal plan of care for the diagnosis(es)/condition(s) as documented were addressed during this visit. Due to the added complexity in care, I will continue to support Arcenio in the subsequent management and with ongoing  continuity of care.

## 2024-07-30 LAB
FERRITIN SERPL-MCNC: 11 NG/ML (ref 6–175)
VIT B12 SERPL-MCNC: 370 PG/ML (ref 232–1245)

## 2024-08-07 ENCOUNTER — ANCILLARY PROCEDURE (OUTPATIENT)
Dept: MRI IMAGING | Facility: CLINIC | Age: 34
End: 2024-08-07
Payer: COMMERCIAL

## 2024-08-07 DIAGNOSIS — Q07.00 ARNOLD-CHIARI MALFORMATION (H): ICD-10-CM

## 2024-08-07 PROCEDURE — A9585 GADOBUTROL INJECTION: HCPCS | Mod: JZ | Performed by: RADIOLOGY

## 2024-08-07 PROCEDURE — 70553 MRI BRAIN STEM W/O & W/DYE: CPT | Mod: GC | Performed by: RADIOLOGY

## 2024-08-07 RX ORDER — GADOBUTROL 604.72 MG/ML
10 INJECTION INTRAVENOUS ONCE
Status: COMPLETED | OUTPATIENT
Start: 2024-08-07 | End: 2024-08-07

## 2024-08-07 RX ADMIN — GADOBUTROL 10 ML: 604.72 INJECTION INTRAVENOUS at 15:55

## 2024-08-28 DIAGNOSIS — K21.9 GASTROESOPHAGEAL REFLUX DISEASE WITHOUT ESOPHAGITIS: ICD-10-CM

## 2024-08-29 RX ORDER — OMEPRAZOLE 40 MG/1
40 CAPSULE, DELAYED RELEASE ORAL DAILY
Qty: 30 CAPSULE | Refills: 0 | Status: SHIPPED | OUTPATIENT
Start: 2024-08-29

## 2024-11-11 ENCOUNTER — VIRTUAL VISIT (OUTPATIENT)
Dept: FAMILY MEDICINE | Facility: CLINIC | Age: 34
End: 2024-11-11

## 2024-11-11 DIAGNOSIS — R21 RASH OF BOTH HANDS: Primary | ICD-10-CM

## 2024-11-11 DIAGNOSIS — F32.81 PMDD (PREMENSTRUAL DYSPHORIC DISORDER): ICD-10-CM

## 2024-11-11 PROCEDURE — 99213 OFFICE O/P EST LOW 20 MIN: CPT | Mod: 95

## 2024-11-11 PROCEDURE — G2211 COMPLEX E/M VISIT ADD ON: HCPCS | Mod: 95

## 2024-11-11 RX ORDER — TRIAMCINOLONE ACETONIDE 5 MG/G
CREAM TOPICAL DAILY
Qty: 15 G | Refills: 0 | Status: SHIPPED | OUTPATIENT
Start: 2024-11-11 | End: 2024-11-15 | Stop reason: DRUGHIGH

## 2024-11-11 RX ORDER — CETIRIZINE HYDROCHLORIDE 10 MG/1
10 TABLET ORAL DAILY
Qty: 90 TABLET | Refills: 0 | Status: SHIPPED | OUTPATIENT
Start: 2024-11-11

## 2024-11-11 NOTE — PATIENT INSTRUCTIONS
Start taking cetirizine 10mg daily or at least 1 week prior to menstrual period    Will increase steroid cream dose    Citalopram for premenstrual dysphoric disorder

## 2024-11-11 NOTE — PROGRESS NOTES
"Arcenio is a 34 year old who is being evaluated via a billable video visit.    How would you like to obtain your AVS? MyChart  If the video visit is dropped, the invitation should be resent by: Text to cell phone: [phone]  Will anyone else be joining your video visit? No      Assessment & Plan     Rash of both hands  - suspect possible progesterone sensitivity given rash only occurs around her menstrual period - will send for allergy testing, until seen can trial cetirizine and higher triamcinolone to help with rash   - Adult Allergy/Asthma  Referral  - cetirizine (ZYRTEC) 10 MG tablet  Dispense: 90 tablet; Refill: 0  - triamcinolone (ARISTOCORT HP) 0.5 % external cream  Dispense: 15 g; Refill: 0    PMDD (premenstrual dysphoric disorder)  - advised needing citalopram or something to help with anxiety and depression, however, she has issues with her liver and reports changes in her stool recently and going on \"benders\" with alcohol consumption - I want her to come in for a visit and lab work prior to starting on medications             BMI  Estimated body mass index is 34.53 kg/m  as calculated from the following:    Height as of 12/21/23: 1.689 m (5' 6.5\").    Weight as of 7/29/24: 98.5 kg (217 lb 3.2 oz).         See Patient Instructions    No follow-ups on file.    Subjective   Arcenio is a 34 year old, presenting for the following health issues:  Med Check (Discuss increasing triamcinolone rx)    Video Start Time: 3:54pm    HPI     1 week prior to menstrual period - get inflamed at DIP causing splitting and this persists 1 week after period is done.                  Review of Systems  Constitutional, HEENT, cardiovascular, pulmonary, gi and gu systems are negative, except as otherwise noted.      Objective           Vitals:  No vitals were obtained today due to virtual visit.    Physical Exam   GENERAL: alert and no distress  EYES: Eyes grossly normal to inspection.  No discharge or erythema, or " obvious scleral/conjunctival abnormalities.  RESP: No audible wheeze, cough, or visible cyanosis.    SKIN: Visible skin clear. No significant rash, abnormal pigmentation or lesions.  NEURO: Cranial nerves grossly intact.  Mentation and speech appropriate for age.  PSYCH: Appropriate affect, tone, and pace of words    No results found for this or any previous visit (from the past 24 hours).      Video-Visit Details    Type of service:  Video Visit   Video End Time:4:09 PM  Originating Location (pt. Location): Other parked car    Distant Location (provider location):  On-site  Platform used for Video Visit: Carter  Signed Electronically by: NANDA Singer CNP    The longitudinal plan of care for the diagnosis(es)/condition(s) as documented were addressed during this visit. Due to the added complexity in care, I will continue to support Arcenio in the subsequent management and with ongoing continuity of care.

## 2024-11-15 ENCOUNTER — OFFICE VISIT (OUTPATIENT)
Dept: FAMILY MEDICINE | Facility: CLINIC | Age: 34
End: 2024-11-15

## 2024-11-15 VITALS
OXYGEN SATURATION: 99 % | HEART RATE: 72 BPM | DIASTOLIC BLOOD PRESSURE: 65 MMHG | BODY MASS INDEX: 35.45 KG/M2 | WEIGHT: 223 LBS | SYSTOLIC BLOOD PRESSURE: 118 MMHG

## 2024-11-15 DIAGNOSIS — R19.4 BOWEL HABIT CHANGES: Primary | ICD-10-CM

## 2024-11-15 DIAGNOSIS — N92.0 MENORRHAGIA WITH REGULAR CYCLE: ICD-10-CM

## 2024-11-15 DIAGNOSIS — F10.10 ALCOHOL CONSUMPTION BINGE DRINKING: ICD-10-CM

## 2024-11-15 LAB
ALBUMIN SERPL BCG-MCNC: 4.2 G/DL (ref 3.5–5.2)
ALP SERPL-CCNC: 56 U/L (ref 40–150)
ALT SERPL W P-5'-P-CCNC: 12 U/L (ref 0–50)
ANION GAP SERPL CALCULATED.3IONS-SCNC: 9 MMOL/L (ref 7–15)
AST SERPL W P-5'-P-CCNC: 17 U/L (ref 0–45)
BILIRUB SERPL-MCNC: <0.2 MG/DL
BUN SERPL-MCNC: 11 MG/DL (ref 6–20)
CALCIUM SERPL-MCNC: 8.8 MG/DL (ref 8.8–10.4)
CHLORIDE SERPL-SCNC: 102 MMOL/L (ref 98–107)
CREAT SERPL-MCNC: 0.82 MG/DL (ref 0.51–0.95)
EGFRCR SERPLBLD CKD-EPI 2021: >90 ML/MIN/1.73M2
FASTING STATUS PATIENT QL REPORTED: NO
GLUCOSE SERPL-MCNC: 114 MG/DL (ref 70–99)
HCO3 SERPL-SCNC: 25 MMOL/L (ref 22–29)
POTASSIUM SERPL-SCNC: 3.6 MMOL/L (ref 3.4–5.3)
PROT SERPL-MCNC: 7 G/DL (ref 6.4–8.3)
SODIUM SERPL-SCNC: 136 MMOL/L (ref 135–145)

## 2024-11-15 PROCEDURE — 36415 COLL VENOUS BLD VENIPUNCTURE: CPT

## 2024-11-15 PROCEDURE — 84155 ASSAY OF PROTEIN SERUM: CPT

## 2024-11-15 NOTE — PATIENT INSTRUCTIONS
"General measures -- In clinical experience, the avoidance of irritants or exacerbating factors is beneficial for most patients with dyshidrotic eczema. General skin care measures aimed at reducing skin irritation and restoring the skin barrier include [33]:  ?Using lukewarm water and mild, synthetic detergent (nonsoap) cleansers to wash hands. Most liquid body cleansers are actually synthetic detergent products with a neutral pH. In contrast, \"natural\" soaps (eg, Castile soap) have an alkaline pH and tend to be more aggressive detergents.  ?Drying hands thoroughly after washing.  ?Applying emollients immediately after hand drying and as often as possible.  ?Wearing cotton gloves under vinyl or other nonlatex gloves when performing wet work.  ?Removing rings, watches, and bracelets before wet work.  ?Wearing protective gloves in cold weather.  ?Wearing task-specific gloves for frictional exposures (eg, gardening, carpentry).  ?Avoiding cutaneous exposure to irritants (eg, harsh detergents, solvents, hair dyes, acidic foods [eg, citrus fruit]).  "

## 2025-01-02 ENCOUNTER — TRANSFERRED RECORDS (OUTPATIENT)
Dept: FAMILY MEDICINE | Facility: CLINIC | Age: 35
End: 2025-01-02

## 2025-01-21 ENCOUNTER — OFFICE VISIT (OUTPATIENT)
Dept: FAMILY MEDICINE | Facility: CLINIC | Age: 35
End: 2025-01-21

## 2025-01-21 VITALS
BODY MASS INDEX: 42.03 KG/M2 | HEIGHT: 67 IN | OXYGEN SATURATION: 100 % | WEIGHT: 267.8 LBS | SYSTOLIC BLOOD PRESSURE: 132 MMHG | DIASTOLIC BLOOD PRESSURE: 72 MMHG | HEART RATE: 83 BPM

## 2025-01-21 DIAGNOSIS — N92.0 MENORRHAGIA WITH REGULAR CYCLE: ICD-10-CM

## 2025-01-21 DIAGNOSIS — F90.2 ATTENTION DEFICIT HYPERACTIVITY DISORDER (ADHD), COMBINED TYPE: Primary | ICD-10-CM

## 2025-01-21 DIAGNOSIS — F41.1 GAD (GENERALIZED ANXIETY DISORDER): ICD-10-CM

## 2025-01-21 PROCEDURE — 99214 OFFICE O/P EST MOD 30 MIN: CPT

## 2025-01-21 PROCEDURE — G2211 COMPLEX E/M VISIT ADD ON: HCPCS

## 2025-01-21 RX ORDER — DEXTROAMPHETAMINE SACCHARATE, AMPHETAMINE ASPARTATE MONOHYDRATE, DEXTROAMPHETAMINE SULFATE AND AMPHETAMINE SULFATE 2.5; 2.5; 2.5; 2.5 MG/1; MG/1; MG/1; MG/1
10 CAPSULE, EXTENDED RELEASE ORAL DAILY
Qty: 30 CAPSULE | Refills: 0 | Status: SHIPPED | OUTPATIENT
Start: 2025-01-21 | End: 2025-01-22

## 2025-01-21 NOTE — PROGRESS NOTES
"  Assessment & Plan     Attention deficit hyperactivity disorder (ADHD), combined type  Reviewed ADHD testing from [facility] for formal diagnosis of ADHD. Medication options were discussed in detail. She elects trial of medication. Various stimulants and formulations discussed.  After thorough discussion, medication prescribed as below.  Potential benefits and harms were discussed in detail and all questions were elicited and answered. Follow up in one month for reassessment, (may be virtual) or sooner as needed. Red flags that warrant emergent evaluation discussed. Patient agreeable to plan. All questions answered.   - amphetamine-dextroamphetamine (ADDERALL XR) 10 MG 24 hr capsule  Dispense: 30 capsule; Refill: 0    MARVEL (generalized anxiety disorder)  Not on medication. Discussed continuing with therapy.     Menorrhagia with regular cycle  Due to family history and heavy menstrual cycle would like US prior to considering IUD placement. Order placed. Follow up reviewed.   - US Pelvic Complete with Transvaginal            Nicotine/Tobacco Cessation  She reports that she has been smoking cigarettes. She does not have any smokeless tobacco history on file.  Nicotine/Tobacco Cessation Plan  Information offered: Patient not interested at this time      BMI  Estimated body mass index is 41.94 kg/m  as calculated from the following:    Height as of this encounter: 1.702 m (5' 7\").    Weight as of this encounter: 121.5 kg (267 lb 12.8 oz).   Weight management plan: Discussed healthy diet and exercise guidelines      Work on weight loss  Regular exercise  See Patient Instructions    Return in about 4 weeks (around 2/18/2025) for Follow up.    Jennifer Rg is a 35 year old, presenting for the following health issues:  A.D.H.D (Results ) and IUD (Wants an US before getting IUD )    HPI     ADHD testing: Through [facility] where she does her therapy. Struggled with attention. Moderate likelihood of attention " "deficits with inattentiveness and difficulty with vigilance. She met diagnostic criteria for attention deficit hyperactivity disorder, predominately inattentive type.     Anxiety present as well, depression symptoms managed.    Overall diagnosed with ADHD, combined type and MARVEL.   Further recommendations include to continue with therapy    IUD: wanting US. Family history of hysterectomies. Has heavy bleeding.       Review of Systems  Constitutional, HEENT, cardiovascular, pulmonary, GI, , musculoskeletal, neuro, skin, endocrine and psych systems are negative, except as otherwise noted.      Objective    /72   Pulse 83   Ht 1.702 m (5' 7\")   Wt 121.5 kg (267 lb 12.8 oz)   SpO2 100%   BMI 41.94 kg/m    Body mass index is 41.94 kg/m .  Physical Exam   GENERAL: alert and no distress  EYES: Eyes grossly normal to inspection, PERRL and conjunctivae and sclerae normal  RESP: lungs clear to auscultation - no rales, rhonchi or wheezes  CV: regular rate and rhythm, normal S1 S2, no S3 or S4, no murmur, click or rub, no peripheral edema  PSYCH: mentation appears normal, affect normal/bright          Signed Electronically by: Salina Mcgovern, NANDA CNP    "

## 2025-01-22 DIAGNOSIS — F90.2 ATTENTION DEFICIT HYPERACTIVITY DISORDER (ADHD), COMBINED TYPE: ICD-10-CM

## 2025-01-22 RX ORDER — DEXTROAMPHETAMINE SACCHARATE, AMPHETAMINE ASPARTATE MONOHYDRATE, DEXTROAMPHETAMINE SULFATE AND AMPHETAMINE SULFATE 2.5; 2.5; 2.5; 2.5 MG/1; MG/1; MG/1; MG/1
10 CAPSULE, EXTENDED RELEASE ORAL DAILY
Qty: 30 CAPSULE | Refills: 0 | Status: SHIPPED | OUTPATIENT
Start: 2025-01-22

## 2025-01-22 NOTE — TELEPHONE ENCOUNTER
CURRENT PHARMACY IS OUT OF STOCK AND PATIENT ASKING TO HAVE SENT TO A DIFFERENT PHARMACY.  THIS WAS APPROVED 1/21/25.  [facility] OUT OF OFFICE    ADDERALL XR 10MG    CUB ON 59/NICOLLET

## 2025-02-02 ENCOUNTER — HEALTH MAINTENANCE LETTER (OUTPATIENT)
Age: 35
End: 2025-02-02

## 2025-02-20 ENCOUNTER — VIRTUAL VISIT (OUTPATIENT)
Dept: FAMILY MEDICINE | Facility: CLINIC | Age: 35
End: 2025-02-20

## 2025-02-20 DIAGNOSIS — L30.1 DYSHIDROTIC ECZEMA: ICD-10-CM

## 2025-02-20 DIAGNOSIS — F90.2 ATTENTION DEFICIT HYPERACTIVITY DISORDER (ADHD), COMBINED TYPE: Primary | ICD-10-CM

## 2025-02-20 RX ORDER — CLOBETASOL PROPIONATE 0.5 MG/G
CREAM TOPICAL 2 TIMES DAILY PRN
Qty: 45 G | Refills: 0 | Status: SHIPPED | OUTPATIENT
Start: 2025-02-20

## 2025-02-20 RX ORDER — DEXTROAMPHETAMINE SACCHARATE, AMPHETAMINE ASPARTATE MONOHYDRATE, DEXTROAMPHETAMINE SULFATE AND AMPHETAMINE SULFATE 3.75; 3.75; 3.75; 3.75 MG/1; MG/1; MG/1; MG/1
15 CAPSULE, EXTENDED RELEASE ORAL DAILY
Qty: 30 CAPSULE | Refills: 0 | Status: SHIPPED | OUTPATIENT
Start: 2025-02-20

## 2025-02-20 NOTE — PROGRESS NOTES
Arcenio is a 35 year old who is being evaluated via a billable telephone visit.    What phone number would you like to be contacted at? [phone]  How would you like to obtain your AVS? [employer]  Originating Location (pt. Location): Home    Distant Location (provider location):  On-site  Telephone visit completed due to the patient could not log into her [facility] for video, while the distant provider had access to video.    Assessment & Plan     Attention deficit hyperactivity disorder (ADHD), combined type  Formal diagnosis of ADHD from [facility]. Taking Adderall 10 mg XR. Tolerating well but after discussion would like to trial increase in dose. Will trail Adderall 15 mg XR daily. Education about adverse effects of prescription medication discussed. Follow up in one month for reassessment, (may be virtual) or sooner as needed. Red flags that warrant emergent evaluation discussed. Patient agreeable to plan. All questions answered.   - amphetamine-dextroamphetamine (ADDERALL XR) 15 MG 24 hr capsule  Dispense: 30 capsule; Refill: 0    Dyshidrotic eczema  Using Clobetasol cream as needed. Stable/controlled. Requesting refill. Continue current medication regimen.  - clobetasol propionate (TEMOVATE) 0.05 % external cream  Dispense: 45 g; Refill: 0              Work on weight loss  Regular exercise  See Patient Instructions    Return in about 4 weeks (around 3/20/2025), or if symptoms worsen or fail to improve, for Follow up.    Subjective   Arcenio is a 35 year old, presenting for the following health issues:  Recheck Medication (Med check on adderall)    HPI       Medication check: Taking Adderall 10 mg XR. Feeling blah in the afternoon and spike then edgy. Typically takes around 9 am. Wears off around noon. Bothering her when taking on an empty stomach. Able to focus. Working well in the first 2 weeks get done what she needs to. But now not as focused. Anxiety is worse due to Adderall. No trouble with sleep. Better  at work when taking Adderall. Was missing meetings previously.         Review of Systems  Constitutional, HEENT, cardiovascular, pulmonary, GI, , musculoskeletal, neuro, skin, endocrine and psych systems are negative, except as otherwise noted.      Objective           Vitals:  No vitals were obtained today due to virtual visit.    Physical Exam   General: Alert and no distress //Respiratory: No audible wheeze, cough, or shortness of breath // Psychiatric:  Appropriate affect, tone, and pace of words          Phone call duration: 22 minutes  Signed Electronically by: NANDA Campos CNP

## 2025-03-04 ENCOUNTER — ANCILLARY PROCEDURE (OUTPATIENT)
Dept: ULTRASOUND IMAGING | Facility: CLINIC | Age: 35
End: 2025-03-04
Payer: COMMERCIAL

## 2025-03-04 DIAGNOSIS — N92.0 MENORRHAGIA WITH REGULAR CYCLE: ICD-10-CM

## 2025-03-04 PROCEDURE — 76830 TRANSVAGINAL US NON-OB: CPT

## 2025-03-13 ENCOUNTER — OFFICE VISIT (OUTPATIENT)
Dept: FAMILY MEDICINE | Facility: CLINIC | Age: 35
End: 2025-03-13

## 2025-03-13 VITALS
WEIGHT: 225 LBS | OXYGEN SATURATION: 100 % | DIASTOLIC BLOOD PRESSURE: 101 MMHG | SYSTOLIC BLOOD PRESSURE: 114 MMHG | HEART RATE: 98 BPM | BODY MASS INDEX: 35.24 KG/M2

## 2025-03-13 DIAGNOSIS — Z30.430 ENCOUNTER FOR INSERTION OF INTRAUTERINE CONTRACEPTIVE DEVICE: Primary | ICD-10-CM

## 2025-03-13 LAB — HCG UR QL: NEGATIVE

## 2025-03-13 RX ORDER — IBUPROFEN 200 MG
600 TABLET ORAL ONCE
Status: COMPLETED | OUTPATIENT
Start: 2025-03-13 | End: 2025-03-13

## 2025-03-13 RX ADMIN — Medication 600 MG: at 16:02

## 2025-03-13 NOTE — PROGRESS NOTES
IUD Intrauterine Device Insertion Procedure Note    PRE-OP DIAGNOSIS: desired long-term, reversible contraception     IUD type: Mirena     Pre procedure urine pregnancy test : Negative    Written consent was obtained for the procedure.     PROCEDURE:   A bimanual exam was performed to determine the position of the uterus.  The speculum was placed. The vagina and cervix was sterilized in the usual manner and sterile technique was maintained throughout the course of the procedure. A single toothed tenaculum was applied to the anterior lip of the cervix and gentle traction applied to straighten and  stabilize it. The depth of the uterus was sounded to a depth of 9.0. With gentle traction on the tenaculum, the  IUD was inserted to the appropriate depth and deposited by withdrawing on the insertion tube holding the rosey steady.  The string was cut to an estimated 4 cm length. Bleeding was minimal. The patient tolerated the procedure well.     Arcenio was seen today for procedure.    Diagnoses and all orders for this visit:    Encounter for insertion of intrauterine contraceptive device  -     HCG urine ([facility])  -     Cancel: HCG qualitative urine; Future  -     INSERTION INTRAUTERINE DEVICE  -     ibuprofen (ADVIL/MOTRIN) tablet 600 mg        Followup: Standard post-procedure care was explained and return precautions are given.

## 2025-03-13 NOTE — PATIENT INSTRUCTIONS
When does my IUD start working?  The copper IUD (Paragard) begins working now.  Hormonal IUDs (Mirena/Liletta, Kyleena, Carrie) start working in 7 days.    What should I do today? When can I have sex?  Today, you may go back to school or work. Please abstain from vaginal intercourse, baths, swimming, tampon use, and menstrual cup use for at least 24 hours after IUD insertion.  Mirena/Liletta, Kyleena, and Carrie IUD users will need back-up contraception (i.e. condoms) to prevent pregnancy in the first 7 days after placement. If you have sex without a condom before 7 days, you should take emergency contraception to prevent pregnancy.    What should I expect?  You may have more cramps or heavier bleeding with your periods, or spotting between your periods. This is normal. The cramping and bleeding can last for 3-6 months with the Mirena/Liletta, Kyleena, and Carrie IUDs. After 6 months, the cramping and bleeding should get better.  Many IUD users will stop having periods after 1 or 2 years with the Mirena/Liletta, Kyleena, and Carrie IUDs.  If you have the Paragard IUD, you may have more cramping and bleeding with your periods as long as you have the IUD inside you.    How should I take care of myself?  Ibuprofen helps decrease the bleeding and cramping. You can buy Ibuprofen at any drug store without a prescription. You can take as many as 4 pills (800 mg) of ibuprofen every 8 hours with food (each pill contains 200 mg).  To prevent cramping, start taking ibuprofen as soon as your period starts and keep taking it every 8 hours for the first 2-3 days of your period.  You can also put a hot water bottle on your belly if you have bad cramps.    Will my IUD come out? What should I do if it comes out?  Your IUD may come out by itself in the first three months. If you can feel the strings, the IUD is in the right place.  If your IUD comes out, you can become pregnant immediately. Use condoms if you think your IUD may have come  out.    How do I check for my IUD strings?  You may check for the strings by inserting one finger into the vagina and feeling for the cervix. The cervix feels similar to the tip of your nose. You should feel strings similar in texture to fishing line coming out of the cervix. Sometimes the strings are tucked high up into the vagina and can be difficult to feel.    Does the IUD protect against sexually transmitted infections (STIs)?  The IUD does NOT protect you against STIs (HIV, syphilis, gonorrhea, chlamydia, herpes, HPV, etc.) You should ALWAYS use protection against sexually transmitted infections (i.e. external condoms, internal condoms, dental dams) if you are at risk.    When should I see a health care provider?  Some IUD users may choose to have a follow-up with a provider 4-6 weeks after insertion to check the IUD s placement. If you are able to feel your strings, and are not having any of the symptoms below, you do not need to follow-up. However, we can check your IUD at any time, for any reason.    Come in for a check within the first 3 weeks of the IUD insertion if you have:  ? Fever (> 101 F)  ? Chills  ? Strong or sharp pain in your stomach or belly    At any time:  ? If you think you have been exposed to an STI  ? Late period (for Paragard users only)  ? Feeling pregnant (breast tenderness, nausea, vomiting)  ? Positive home pregnancy test  ? Foul smelling vaginal discharge

## 2025-04-24 ENCOUNTER — VIRTUAL VISIT (OUTPATIENT)
Dept: FAMILY MEDICINE | Facility: CLINIC | Age: 35
End: 2025-04-24

## 2025-04-24 DIAGNOSIS — F90.2 ATTENTION DEFICIT HYPERACTIVITY DISORDER (ADHD), COMBINED TYPE: Primary | ICD-10-CM

## 2025-04-24 PROCEDURE — 98005 SYNCH AUDIO-VIDEO EST LOW 20: CPT

## 2025-04-24 NOTE — PROGRESS NOTES
Arcenio is a 35 year old who is being evaluated via a billable video visit.    How would you like to obtain your AVS? MyChart  If the video visit is dropped, the invitation should be resent by: Text to cell phone: [phone]  Will anyone else be joining your video visit? No      Assessment & Plan     Attention deficit hyperactivity disorder (ADHD), combined type  Diagnosis of ADHD through [facility]. Taking Adderall 20 mg XR. Has taken it for 2 days now. Tolerating well. Will continue current dose. Side effects of stimulant prescription medication discussed. PDMP reviewed. Appropriate refill history observed. Reviewed ADHD, including the typical symptoms and usual diagnostic criteria, the suspected pathophysiology, and the role of medications. Reviewed close monitoring including regular follow up.  I told the patient that any noncompliance with regard to prescriptions or follow up may result in us declining to provide further refills of these medications. Patient in agreement with plan. All questions answered. Follow up in 1 month or sooner as needed.             Follow-up  Return in 4 weeks (on 5/22/2025), or if symptoms worsen or fail to improve, for Follow up.    Subjective   Arcenio is a 35 year old, presenting for the following health issues:  Recheck Medication (Med check on aderrall)    Video Start Time: 2:43 PM    HPI      Med check: Taking Adderall 20 mg. Recent dose increase 2 days ago. Usually little more energy but sick right now (friend had strep). No problems sleeping. Appetite is good. Smaller breakfast and eating meals normally. Able to stay focused longer. Not having to take the 5 mg later in the day.     Reflux has isadora fine. Made some diet changes. Possible related to eggs.     Review of Systems  Constitutional, HEENT, cardiovascular, pulmonary, gi and gu systems are negative, except as otherwise noted.      Objective           Vitals:  No vitals were obtained today due to virtual  visit.    Physical Exam   GENERAL: alert and no distress  EYES: Eyes grossly normal to inspection.  No discharge or erythema, or obvious scleral/conjunctival abnormalities.  RESP: No audible wheeze, cough, or visible cyanosis.    SKIN: Visible skin clear. No significant rash, abnormal pigmentation or lesions.  NEURO: Cranial nerves grossly intact.  Mentation and speech appropriate for age.  PSYCH: Appropriate affect, tone, and pace of words          Video-Visit Details    Type of service:  Video Visit   Video End Time:3:01 PM  Originating Location (pt. Location): Home    Distant Location (provider location):  On-site  Platform used for Video Visit: Carter  Signed Electronically by: NANDA Campos CNP

## 2025-05-12 ENCOUNTER — TRANSFERRED RECORDS (OUTPATIENT)
Dept: FAMILY MEDICINE | Facility: CLINIC | Age: 35
End: 2025-05-12

## 2025-05-13 ENCOUNTER — OFFICE VISIT (OUTPATIENT)
Dept: FAMILY MEDICINE | Facility: CLINIC | Age: 35
End: 2025-05-13

## 2025-05-13 ENCOUNTER — RESULTS FOLLOW-UP (OUTPATIENT)
Dept: FAMILY MEDICINE | Facility: CLINIC | Age: 35
End: 2025-05-13

## 2025-05-13 VITALS
OXYGEN SATURATION: 99 % | WEIGHT: 211.8 LBS | HEART RATE: 103 BPM | SYSTOLIC BLOOD PRESSURE: 104 MMHG | DIASTOLIC BLOOD PRESSURE: 66 MMHG | BODY MASS INDEX: 33.17 KG/M2

## 2025-05-13 DIAGNOSIS — K21.9 GASTROESOPHAGEAL REFLUX DISEASE WITHOUT ESOPHAGITIS: ICD-10-CM

## 2025-05-13 DIAGNOSIS — R42 DIZZINESS: Primary | ICD-10-CM

## 2025-05-13 DIAGNOSIS — F90.2 ATTENTION DEFICIT HYPERACTIVITY DISORDER (ADHD), COMBINED TYPE: ICD-10-CM

## 2025-05-13 LAB
% GRANULOCYTES: 64.4 % (ref 42.2–75.2)
ALBUMIN SERPL BCG-MCNC: 4.2 G/DL (ref 3.5–5.2)
ALP SERPL-CCNC: 56 U/L (ref 40–150)
ALT SERPL W P-5'-P-CCNC: 10 U/L (ref 0–50)
ANION GAP SERPL CALCULATED.3IONS-SCNC: 11 MMOL/L (ref 7–15)
AST SERPL W P-5'-P-CCNC: 20 U/L (ref 0–45)
BILIRUB SERPL-MCNC: 0.3 MG/DL
BUN SERPL-MCNC: 8.7 MG/DL (ref 6–20)
CALCIUM SERPL-MCNC: 9.1 MG/DL (ref 8.8–10.4)
CHLORIDE SERPL-SCNC: 104 MMOL/L (ref 98–107)
CREAT SERPL-MCNC: 0.8 MG/DL (ref 0.51–0.95)
EGFRCR SERPLBLD CKD-EPI 2021: >90 ML/MIN/1.73M2
FASTING STATUS PATIENT QL REPORTED: NO
GLUCOSE SERPL-MCNC: 91 MG/DL (ref 70–99)
HCO3 SERPL-SCNC: 21 MMOL/L (ref 22–29)
HCT VFR BLD AUTO: 32.8 % (ref 35–46)
HEMOGLOBIN: 11.2 G/DL (ref 11.8–15.5)
LYMPHOCYTES NFR BLD AUTO: 28.8 % (ref 20.5–51.1)
MCH RBC QN AUTO: 28 PG (ref 27–31)
MCHC RBC AUTO-ENTMCNC: 34.2 G/DL (ref 33–37)
MCV RBC AUTO: 82.1 FL (ref 80–100)
MONOCYTES NFR BLD AUTO: 6.8 % (ref 1.7–9.3)
PLATELET # BLD AUTO: 315 K/UL (ref 140–450)
POTASSIUM SERPL-SCNC: 3.8 MMOL/L (ref 3.4–5.3)
PROT SERPL-MCNC: 7.2 G/DL (ref 6.4–8.3)
RBC # BLD AUTO: 4 X10/CMM (ref 3.7–5.2)
SODIUM SERPL-SCNC: 136 MMOL/L (ref 135–145)
WBC # BLD AUTO: 6.1 X10/CMM (ref 3.8–11)

## 2025-05-13 PROCEDURE — G2211 COMPLEX E/M VISIT ADD ON: HCPCS

## 2025-05-13 PROCEDURE — 80053 COMPREHEN METABOLIC PANEL: CPT | Mod: ORL

## 2025-05-13 PROCEDURE — 85025 COMPLETE CBC W/AUTO DIFF WBC: CPT

## 2025-05-13 PROCEDURE — 99214 OFFICE O/P EST MOD 30 MIN: CPT

## 2025-05-13 PROCEDURE — 36415 COLL VENOUS BLD VENIPUNCTURE: CPT

## 2025-05-13 PROCEDURE — 3078F DIAST BP <80 MM HG: CPT

## 2025-05-13 PROCEDURE — 3074F SYST BP LT 130 MM HG: CPT

## 2025-05-13 RX ORDER — DEXTROAMPHETAMINE SACCHARATE, AMPHETAMINE ASPARTATE MONOHYDRATE, DEXTROAMPHETAMINE SULFATE AND AMPHETAMINE SULFATE 3.75; 3.75; 3.75; 3.75 MG/1; MG/1; MG/1; MG/1
15 CAPSULE, EXTENDED RELEASE ORAL DAILY
Qty: 30 CAPSULE | Refills: 0 | Status: SHIPPED | OUTPATIENT
Start: 2025-05-13

## 2025-05-13 RX ORDER — DEXTROAMPHETAMINE SACCHARATE, AMPHETAMINE ASPARTATE, DEXTROAMPHETAMINE SULFATE AND AMPHETAMINE SULFATE 1.25; 1.25; 1.25; 1.25 MG/1; MG/1; MG/1; MG/1
5 TABLET ORAL DAILY
Qty: 30 TABLET | Refills: 0 | Status: SHIPPED | OUTPATIENT
Start: 2025-05-13

## 2025-05-13 ASSESSMENT — ENCOUNTER SYMPTOMS: DIZZINESS: 1

## 2025-05-13 NOTE — PROGRESS NOTES
Assessment & Plan     Attention deficit hyperactivity disorder (ADHD), combined type  Diagnosis of ADHD through [facility]. Taking Adderall 20 mg XR. Possible side effects with current dose affecting sleep. Will decrease dose to Adderall 15 mg daily with additional Adderall 5 mg IR tablets as needed. Side effects of stimulant prescription medication discussed. PDMP reviewed. Appropriate refill history observed. Reviewed ADHD, including the typical symptoms and usual diagnostic criteria, the suspected pathophysiology, and the role of medications. Reviewed close monitoring including regular follow up.  I told the patient that any noncompliance with regard to prescriptions or follow up may result in us declining to provide further refills of these medications. Patient in agreement with plan. All questions answered. Follow up in 1 month or sooner as needed.     - amphetamine-dextroamphetamine (ADDERALL XR) 15 MG 24 hr capsule  Dispense: 30 capsule; Refill: 0  - amphetamine-dextroamphetamine (ADDERALL) 5 MG tablet  Dispense: 30 tablet; Refill: 0    Gastroesophageal reflux disease without esophagitis  Taking Omeprazole 40 mg daily. Symptoms uncontrolled. Discussed follow up with GI. Reviewed anti-reflux measures such as raising the head of the bed, avoiding tight clothing or belts, avoiding eating late at night and not lying down shortly after mealtime and achieving weight loss discussed. Reviewed foods to avoid. Red flags that warrant emergent evaluation discussed. Follow up as needed for new or worsening symptoms or if symptoms fail to improve. Patient agreeable to plan. All questions answered.   - Adult GI  Referral - Consult Only - To a [facility] Location (Use POS/Location)    Dizziness  Reviewed symptoms in detail. Discussed possible etiologies. Symptoms improving with physical therapy. Will also check labs. Continue current treatment plan with PT. Red flags that warrant emergent evaluation  discussed. Follow up as needed for new or worsening symptoms or if symptoms fail to improve. Patient agreeable to plan. All questions answered.   - CBC with Diff/Plt ([facility])  - VENOUS COLLECTION  - Comprehensive metabolic panel  - Comprehensive metabolic panel            Follow-up  Return in 4 weeks (on 6/10/2025), or if symptoms worsen or fail to improve, for Follow up.    Jennifer Rg is a 35 year old, presenting for the following health issues:  Sleep Problem (Difficulty sleeping at night due to SOB, feeling very fatigue due to this ) and Dizziness (Still dealing with dizziness throughout the day, has discussed before and is still going to PT, although feels she is dealing with flare up )    Dizziness    History of Present Illness       Reason for visit:  Dizzy, SHORTNESS OF BREATH WHEN Laying down, fatigue  Symptom onset:  3-4 weeks ago   She is taking medications regularly.         Concern - Sleep problem  Onset: 3-4 weeks ago. Thinking with increase in dose of Adderall.   Description: Shortness of breath with laying down. Thinking the dose of Adderall is too high. Only taking the 5 mg IR on weekends. Tired when not taking the Adderall. Could not get a breath of air at night. Maybe for 4 nights then has not had for awhile. Taking Omeprazole for heartburn. Hasn't been drinking or having caffeine for the past 3 months. Taking out spicy foods and tomatoes. Still smoking about a pack a week. Concerned for allergies. Also thought first was anxiety since about the time her partner when into the hospital.  May 26th. Trying to get exercise. Tried hydroxyzine in the past and didn't like.    Intensity: sometimes stabbing   Progression of Symptoms:  improving, intermittent at night.   Accompanying Signs & Symptoms: Had 4 nights of shortness of breath. Fatigue. No fevers. No URI symptoms. Heartburn has been worse. Frog sitting in her chest.   Previous history of similar problem: none  Precipitating factors:         Worsened by: none  Alleviating factors:        Improved by: up right during the day went away.   Therapies tried and outcome:  none       Dizziness: Mostly when bending down and getting back up. Not has frequent as before. PT has helped it. But it has been ongoing.       Review of Systems  Constitutional, HEENT, cardiovascular, pulmonary, gi and gu systems are negative, except as otherwise noted.      Objective    /66   Pulse 103   Wt 96.1 kg (211 lb 12.8 oz)   SpO2 99%   BMI 33.17 kg/m    Body mass index is 33.17 kg/m .  Physical Exam   GENERAL: alert and no distress  RESP: lungs clear to auscultation - no rales, rhonchi or wheezes  CV: regular rate and rhythm, normal S1 S2, no S3 or S4, no murmur, click or rub, no peripheral edema  ABDOMEN: soft, nontender and bowel sounds normal  PSYCH: mentation appears normal, affect normal/bright    Results for orders placed or performed in visit on 05/13/25 (from the past 24 hours)   Comprehensive metabolic panel   Result Value Ref Range    Sodium 136 135 - 145 mmol/L    Potassium 3.8 3.4 - 5.3 mmol/L    Carbon Dioxide (CO2) 21 (L) 22 - 29 mmol/L    Anion Gap 11 7 - 15 mmol/L    Urea Nitrogen 8.7 6.0 - 20.0 mg/dL    Creatinine 0.80 0.51 - 0.95 mg/dL    GFR Estimate >90 >60 mL/min/1.73m2    Calcium 9.1 8.8 - 10.4 mg/dL    Chloride 104 98 - 107 mmol/L    Glucose 91 70 - 99 mg/dL    Alkaline Phosphatase 56 40 - 150 U/L    AST 20 0 - 45 U/L    ALT 10 0 - 50 U/L    Protein Total 7.2 6.4 - 8.3 g/dL    Albumin 4.2 3.5 - 5.2 g/dL    Bilirubin Total 0.3 <=1.2 mg/dL    Patient Fasting > 8hrs? No    CBC with Diff/Plt ([facility])   Result Value Ref Range    WBC x10/cmm 6.1 3.8 - 11.0 x10/cmm    % Lymphocytes 28.8 20.5 - 51.1 %    % Monocytes 6.8 1.7 - 9.3 %    % Granulocytes 64.4 42.2 - 75.2 %    RBC x10/cmm 4.00 3.7 - 5.2 x10/cmm    Hemoglobin 11.2 (A) 11.8 - 15.5 g/dl    Hematocrit 32.8 (A) 35 - 46 %    MCV 82.1 80 - 100 fL    MCH 28.0 27.0 - 31.0 pg    MCHC 34.2 33.0 - 37.0 g/dL     Platelet Count 315 140 - 450 K/uL           Signed Electronically by: NANDA Campos CNP